# Patient Record
Sex: MALE | Race: WHITE
[De-identification: names, ages, dates, MRNs, and addresses within clinical notes are randomized per-mention and may not be internally consistent; named-entity substitution may affect disease eponyms.]

---

## 2019-03-28 ENCOUNTER — APPOINTMENT (OUTPATIENT)
Dept: ORTHOPEDIC SURGERY | Facility: CLINIC | Age: 64
End: 2019-03-28
Payer: COMMERCIAL

## 2019-03-28 VITALS — WEIGHT: 188 LBS | BODY MASS INDEX: 28.49 KG/M2 | HEIGHT: 68 IN

## 2019-03-28 DIAGNOSIS — Z86.39 PERSONAL HISTORY OF OTHER ENDOCRINE, NUTRITIONAL AND METABOLIC DISEASE: ICD-10-CM

## 2019-03-28 DIAGNOSIS — G89.29 LOW BACK PAIN: ICD-10-CM

## 2019-03-28 DIAGNOSIS — M54.41 LUMBAGO WITH SCIATICA, RIGHT SIDE: ICD-10-CM

## 2019-03-28 DIAGNOSIS — M51.26 OTHER INTERVERTEBRAL DISC DISPLACEMENT, LUMBAR REGION: ICD-10-CM

## 2019-03-28 DIAGNOSIS — M54.5 LOW BACK PAIN: ICD-10-CM

## 2019-03-28 DIAGNOSIS — J18.9 PNEUMONIA, UNSPECIFIED ORGANISM: ICD-10-CM

## 2019-03-28 DIAGNOSIS — M51.36 OTHER INTERVERTEBRAL DISC DEGENERATION, LUMBAR REGION: ICD-10-CM

## 2019-03-28 DIAGNOSIS — Z87.448 PERSONAL HISTORY OF OTHER DISEASES OF URINARY SYSTEM: ICD-10-CM

## 2019-03-28 DIAGNOSIS — G89.29 LUMBAGO WITH SCIATICA, RIGHT SIDE: ICD-10-CM

## 2019-03-28 PROCEDURE — 99215 OFFICE O/P EST HI 40 MIN: CPT

## 2019-03-29 PROBLEM — J18.9 COMMUNITY ACQUIRED PNEUMONIA: Status: RESOLVED | Noted: 2019-03-29 | Resolved: 2019-03-29

## 2019-03-29 PROBLEM — Z87.448 HISTORY OF HEMATURIA: Status: RESOLVED | Noted: 2019-03-29 | Resolved: 2019-03-29

## 2019-03-29 PROBLEM — M54.5 CHRONIC RIGHT-SIDED LOW BACK PAIN WITHOUT SCIATICA: Status: ACTIVE | Noted: 2019-03-29

## 2019-03-29 PROBLEM — Z86.39 HISTORY OF HYPERLIPIDEMIA: Status: RESOLVED | Noted: 2019-03-29 | Resolved: 2019-03-29

## 2019-03-29 PROBLEM — M54.5 ACUTE BILATERAL LOW BACK PAIN WITHOUT SCIATICA: Status: ACTIVE | Noted: 2019-03-29

## 2019-03-29 PROBLEM — M54.41 CHRONIC BILATERAL LOW BACK PAIN WITH RIGHT-SIDED SCIATICA: Status: ACTIVE | Noted: 2019-03-29

## 2019-03-29 NOTE — PHYSICAL EXAM
[de-identified] : He is fully alert and oriented with a normal mood and affect. He is in no acute distress. He ambulates with a normal gait including tiptoe and heel walking. There are no cutaneous abnormalities were palpable bony defects of the spine. There is no evidence of shortness of breath or respiratory distress. There is no paravertebral muscle spasm, sciatic notch tenderness or trochanteric tenderness. Forward flexion of the spine shows the fingertips reaching to within 12 inches of the floor. His lower extremity neurological examination revealed 1+ symmetrical reflexes with normal motor power. There is some decreased sensation along the lateral border of his right foot from his prior disc herniation. Straight leg raising is negative to 90°. His knees have full range of motion with normal stability. Vascular exam shows no evidence of varicosities and there is no lymphedema. There are no cutaneous abnormalities of the upper or lower extremities. His upper extremities are normal to inspection and his elbows have a full range of motion with normal motor power and stability. [de-identified] : I reviewed prior MRI of the lumbar spine it reveals multilevel degenerative changes and disc desiccation with a significantly narrowed disc space at L4-5 with a minor bulging disc at that level and a right-sided herniated lumbar disc at L5-S1. I also reviewed prior x-rays of the lumbar spine they reveal multilevel degenerative changes with marked disc space narrowing at L4-5. I do not think repeat x-rays are necessary.

## 2019-03-29 NOTE — REASON FOR VISIT
[Initial Visit] : an initial visit for [Degenerative Joint Disease] : degenerative joint disease [Back Pain] : back pain [FreeTextEntry2] : Lower back pain

## 2019-03-29 NOTE — HISTORY OF PRESENT ILLNESS
[de-identified] : This 63-year-old male is referred by Dr. Behzod Paimany for evaluation of spinal related symptoms. He started with symptoms of intermittent back pain in the 1980s. The symptoms would occur twice a year without associated leg pain. In 2016 after doing some cement work around his house he had the onset of lower back pain with right leg pain. An MRI at that point revealed multilevel disc desiccation with a right-sided herniated lumbar disc at L5-S1. The symptoms resolved with a Medrol Dosepak. 2 weeks ago he was cleaning his yard and had some soreness in his back. A week later he was bending over as his  light on his burner was out and had a mild recurrence of the back pain. 2 days ago he was changing a battery in his wife's car and had a marked increase in the back pain without associated leg pain. The pain was a 10 but has decreased to one the last 2 days. He has not had associated neurologic symptoms. [Pain Location] : pain [Improving] : improving

## 2019-03-29 NOTE — CONSULT LETTER
[Dear  ___] : Dear  [unfilled], [Please see my note below.] : Please see my note below. [Sincerely,] : Sincerely, [FreeTextEntry1] : Thank you for referring this gentleman for evaluation of his current spine related symptoms.

## 2025-03-13 ENCOUNTER — APPOINTMENT (OUTPATIENT)
Age: 70
End: 2025-03-13

## 2025-05-30 ENCOUNTER — APPOINTMENT (OUTPATIENT)
Age: 70
End: 2025-05-30
Payer: COMMERCIAL

## 2025-05-30 ENCOUNTER — NON-APPOINTMENT (OUTPATIENT)
Age: 70
End: 2025-05-30

## 2025-05-30 PROCEDURE — 92202 OPSCPY EXTND ON/MAC DRAW: CPT

## 2025-05-30 PROCEDURE — 92020 GONIOSCOPY: CPT

## 2025-05-30 PROCEDURE — 92134 CPTRZ OPH DX IMG PST SGM RTA: CPT

## 2025-05-30 PROCEDURE — 92004 COMPRE OPH EXAM NEW PT 1/>: CPT
